# Patient Record
Sex: FEMALE | Race: WHITE | HISPANIC OR LATINO | Employment: FULL TIME | URBAN - METROPOLITAN AREA
[De-identification: names, ages, dates, MRNs, and addresses within clinical notes are randomized per-mention and may not be internally consistent; named-entity substitution may affect disease eponyms.]

---
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
## 2022-01-25 ENCOUNTER — TELEPHONE (OUTPATIENT)
Dept: FAMILY MEDICINE | Facility: CLINIC | Age: 22
End: 2022-01-25
Payer: COMMERCIAL

## 2022-01-25 DIAGNOSIS — Z30.9 ENCOUNTER FOR CONTRACEPTIVE MANAGEMENT, UNSPECIFIED TYPE: Primary | ICD-10-CM

## 2022-01-25 NOTE — TELEPHONE ENCOUNTER
Patient has apt Thursday 1-27-22 for depo injection, please place order.     Last ov 11-17-20 with Mirlande   Last Depo 11-10-21

## 2022-01-27 RX ORDER — MEDROXYPROGESTERONE ACETATE 150 MG/ML
150 INJECTION, SUSPENSION INTRAMUSCULAR
Status: ACTIVE | OUTPATIENT
Start: 2022-01-27 | End: 2023-01-22

## 2022-02-10 ENCOUNTER — ALLIED HEALTH/NURSE VISIT (OUTPATIENT)
Dept: FAMILY MEDICINE | Facility: CLINIC | Age: 22
End: 2022-02-10
Payer: COMMERCIAL

## 2022-02-10 DIAGNOSIS — Z30.42 DEPO-PROVERA CONTRACEPTIVE STATUS: Primary | ICD-10-CM

## 2022-02-10 PROCEDURE — 99207 PR NO CHARGE NURSE ONLY: CPT

## 2022-02-10 PROCEDURE — 96372 THER/PROPH/DIAG INJ SC/IM: CPT | Performed by: FAMILY MEDICINE

## 2022-02-10 RX ADMIN — MEDROXYPROGESTERONE ACETATE 150 MG: 150 INJECTION, SUSPENSION INTRAMUSCULAR at 13:26

## 2022-05-19 ENCOUNTER — TELEPHONE (OUTPATIENT)
Dept: FAMILY MEDICINE | Facility: CLINIC | Age: 22
End: 2022-05-19

## 2022-10-26 ENCOUNTER — NURSE TRIAGE (OUTPATIENT)
Dept: FAMILY MEDICINE | Facility: CLINIC | Age: 22
End: 2022-10-26

## 2022-10-26 NOTE — TELEPHONE ENCOUNTER
Patient Quality Outreach    Patient is due for the following:   Physical Preventive Adult Physical    Next Steps:   Schedule a Adult Preventative    Type of outreach:    Sent letter.    Next Steps:  Reach out within 90 days via Letter.    Max number of attempts reached: No. Will try again in 90 days if patient still on fail list.

## 2022-10-26 NOTE — LETTER
October 26, 2022      Luisana Rudd  [address] [place] [id number]        Dear Luisana,       In reviewing your records, we have identified a gap in your preventive services. Based on your age and health history, we recommend the following services.     Preventative Adult Physical     Please call [phone] and choose option 1 to schedule this appointment.      We believe that a strong preventive care program, including regular physical and follow-up healthcare appointments are an important part of a healthy lifestyle and we are committed to helping you maintain your health.     Thank you for choosing us as your health care provider.     Sincerely,        [facility]

## 2023-06-01 ENCOUNTER — APPOINTMENT (OUTPATIENT)
Dept: RADIOLOGY | Facility: CLINIC | Age: 23
End: 2023-06-01
Attending: EMERGENCY MEDICINE
Payer: COMMERCIAL

## 2023-06-01 ENCOUNTER — HOSPITAL ENCOUNTER (EMERGENCY)
Facility: CLINIC | Age: 23
Discharge: HOME | End: 2023-06-02
Attending: EMERGENCY MEDICINE | Admitting: EMERGENCY MEDICINE
Payer: COMMERCIAL

## 2023-06-01 VITALS
HEIGHT: 63 IN | RESPIRATION RATE: 16 BRPM | TEMPERATURE: 97.9 F | BODY MASS INDEX: 21.95 KG/M2 | SYSTOLIC BLOOD PRESSURE: 92 MMHG | OXYGEN SATURATION: 100 % | WEIGHT: 123.9 LBS | HEART RATE: 59 BPM | DIASTOLIC BLOOD PRESSURE: 41 MMHG
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
## 2023-06-01 DIAGNOSIS — S41.152A DOG BITE OF LEFT UPPER EXTREMITY, INITIAL ENCOUNTER: ICD-10-CM

## 2023-06-01 PROCEDURE — 73090 X-RAY EXAM OF FOREARM: CPT | Mod: LT

## 2023-06-01 PROCEDURE — 250N000013 HC RX MED GY IP 250 OP 250 PS 637: Performed by: EMERGENCY MEDICINE

## 2023-06-01 PROCEDURE — 99284 EMERGENCY DEPT VISIT MOD MDM: CPT

## 2023-06-01 RX ORDER — IBUPROFEN 600 MG/1
600 TABLET, FILM COATED ORAL ONCE
Status: COMPLETED | OUTPATIENT
Start: 2023-06-01 | End: 2023-06-01

## 2023-06-01 RX ORDER — OXYCODONE HYDROCHLORIDE 5 MG/1
5 TABLET ORAL EVERY 6 HOURS PRN
Qty: 12 TABLET | Refills: 0 | Status: SHIPPED | OUTPATIENT
Start: 2023-06-01 | End: 2023-06-04

## 2023-06-01 RX ORDER — OXYCODONE HYDROCHLORIDE 5 MG/1
5 TABLET ORAL ONCE
Status: COMPLETED | OUTPATIENT
Start: 2023-06-02 | End: 2023-06-02

## 2023-06-01 RX ADMIN — IBUPROFEN 600 MG: 600 TABLET ORAL at 23:20

## 2023-06-01 ASSESSMENT — ENCOUNTER SYMPTOMS
WOUND: 1
MYALGIAS: 1

## 2023-06-01 NOTE — LETTER
June 2, 2023      To Whom It May Concern:      Luisana Rudd was seen in our Emergency Department today, 06/02/23.  I expect her condition to improve over the next 5 days.  She may return to work  when improved.    Sincerely,        Miguel Merchant MD

## 2023-06-02 PROCEDURE — 250N000013 HC RX MED GY IP 250 OP 250 PS 637: Performed by: EMERGENCY MEDICINE

## 2023-06-02 RX ADMIN — OXYCODONE HYDROCHLORIDE 5 MG: 5 TABLET ORAL at 00:05

## 2023-06-02 NOTE — ED TRIAGE NOTES
Arrives to ED with c/o dog bite that occurred just PTA. Multiple punctures to LFA. Bleeding controlled. Endorses tingling. Bit by own dog, UTD on rabies vaccine.      Triage Assessment     Row Name 06/01/23 9395       Triage Assessment (Adult)    Airway WDL WDL       Respiratory WDL    Respiratory WDL WDL       Skin Circulation/Temperature WDL    Skin Circulation/Temperature WDL WDL       Cardiac WDL    Cardiac WDL X;rhythm    Pulse Rate & Regularity bradycardic       Peripheral/Neurovascular WDL    Peripheral Neurovascular WDL WDL       Cognitive/Neuro/Behavioral WDL    Cognitive/Neuro/Behavioral WDL WDL

## 2023-06-02 NOTE — ED PROVIDER NOTES
EMERGENCY DEPARTMENT ENCOUNTER      NAME: Luisana Rudd  AGE: 22 year old female  YOB: 2000  MRN: [id number]  EVALUATION DATE & TIME: No admission date for patient encounter.    PCP: Kari Munoz    ED PROVIDER: Reji Merchant M.D.      Chief Complaint   Patient presents with     Dog Bite         FINAL IMPRESSION:  1. Dog bite of left upper extremity, initial encounter          ED COURSE & MEDICAL DECISION MAKING:    Pertinent Labs & Imaging studies reviewed. (See chart for details)  22 year old female presents to the Emergency Department for evaluation of dog bite.  Her dog bit her left forearm.  She was  her dog from a fight.  This was a provoked attack.  Rabies vaccinations up-to-date.  Patient's tetanus up-to-date.  Wound irrigated.  X-ray does not show any broken bone or foreign body.  Given that the dog bite a do not think I would repair these lacerations.  We will place her on antibiotics.  She will follow-up with primary.  Return for worsening symptoms.    11:11 PM I met with the patient to gather history and to perform my initial exam. I discussed the plan for care while in the Emergency Department.  11:53 PM Rechecked and updated the patient. We discussed the plan for discharge and the patient is agreeable. Reviewed supportive cares, symptomatic treatment, outpatient follow up, and reasons to return to the Emergency Department. Patient to be discharged by ED RN.     At the conclusion of the encounter I discussed the results of all of the tests and the disposition. The questions were answered. The patient or family acknowledged understanding and was agreeable with the care plan.     Medical Decision Making    History:    Supplemental history from: Documented in chart, if applicable and Family Member/Significant Other    External Record(s) reviewed: Documented in chart, if applicable.    Work Up:    Chart documentation includes differential considered and any EKGs or imaging  independently interpreted by provider, where specified.    In additional to work up documented, I considered the following work up: Documented in chart, if applicable.    External consultation:    Discussion of management with another provider: Documented in chart, if applicable    Complicating factors:    Care impacted by chronic illness: Mental Health    Care affected by social determinants of health: N/A    Disposition considerations: Discharge. I prescribed additional prescription strength medication(s) as charted. N/A.          MEDICATIONS GIVEN IN THE EMERGENCY:  Medications   ibuprofen (ADVIL/MOTRIN) tablet 600 mg (600 mg Oral $Given 6/1/23 2320)   oxyCODONE (ROXICODONE) tablet 5 mg (5 mg Oral $Given 6/2/23 0005)       NEW PRESCRIPTIONS STARTED AT TODAY'S ER VISIT  Discharge Medication List as of 6/1/2023 11:59 PM      START taking these medications    Details   amoxicillin-clavulanate (AUGMENTIN) 875-125 MG tablet Take 1 tablet by mouth 2 times daily for 7 days, Disp-14 tablet, R-0, Local Print      oxyCODONE (ROXICODONE) 5 MG tablet Take 1 tablet (5 mg) by mouth every 6 hours as needed, Disp-12 tablet, R-0, Local Print                =================================================================    HPI    Patient information was obtained from: Patient     Use of : N/A         Luisana Rudd is a 22 year old female with no pertinent medical history who presents to this ED via car for evaluation of dog bite.     Patient reports a dog bite on her left arm earlier this evening. States that she was trying to separate her 2 dogs from fighting when one of them bit her. She endorses left arm pain after the bite. Has not taken any medications for her pain yet. Her dog is up-to-date on his immunizations. She has a latex allergy. Patient is otherwise healthy and denies a history of medical problems.       REVIEW OF SYSTEMS   Review of Systems   Musculoskeletal: Positive for myalgias (Left arm pain ).    Skin: Positive for wound (Left arm).   All other systems reviewed and are negative.       PAST MEDICAL HISTORY:  Past Medical History:   Diagnosis Date     Depressive disorder        PAST SURGICAL HISTORY:  Past Surgical History:   Procedure Laterality Date     ENT SURGERY  14 months    Tubes in her ears     HC REMOVE TONSILS/ADENOIDS,<13 Y/O      Description: Tonsillectomy With Adenoidectomy;  Recorded: 01/22/2009;           CURRENT MEDICATIONS:    No current facility-administered medications for this encounter.     Current Outpatient Medications   Medication     amoxicillin-clavulanate (AUGMENTIN) 875-125 MG tablet     oxyCODONE (ROXICODONE) 5 MG tablet     medroxyPROGESTERone (DEPO-PROVERA) 150 MG/ML IM injection     Facility-Administered Medications Ordered in Other Encounters   Medication     acetaminophen (TYLENOL) tablet 650 mg     benzocaine-menthol (CEPACOL) 15-3.6 MG lozenge 1 lozenge     calcium carbonate (TUMS) chewable tablet 1,000 mg     ibuprofen (ADVIL/MOTRIN) tablet 200 mg         ALLERGIES:  Allergies   Allergen Reactions     Latex Hives       FAMILY HISTORY:  Family History   Problem Relation Age of Onset     Anxiety Disorder Mother      Depression Maternal Grandmother      Anxiety Disorder Maternal Grandmother      Bipolar Disorder Maternal Grandfather      Mental Illness Paternal Grandmother      Anxiety Disorder Brother      Schizophrenia No family hx of      Substance Abuse No family hx of      Dementia No family hx of      Adriana Disease No family hx of      Parkinsonism No family hx of      Autism Spectrum Disorder No family hx of      Intellectual Disability (Mental Retardation) No family hx of      Cervical Cancer Mother      Skin Cancer Maternal Grandmother      Clotting Disorder No family hx of        SOCIAL HISTORY:   Social History     Socioeconomic History     Marital status: Single   Tobacco Use     Smoking status: Never     Smokeless tobacco: Never   Substance and Sexual  "Activity     Alcohol use: Not Currently     Drug use: Not Currently   Social History Narrative    ** Merged History Encounter **         VITALS:  BP 92/41   Pulse 59   Temp 97.9  F (36.6  C) (Temporal)   Resp 16   Ht 1.6 m (5' 3\")   Wt 56.2 kg (123 lb 14.4 oz)   LMP 05/17/2023   SpO2 100%   BMI 21.95 kg/m      PHYSICAL EXAM    Physical Exam  Vitals and nursing note reviewed.   Constitutional:       General: She is not in acute distress.     Appearance: She is not diaphoretic.   HENT:      Head: Atraumatic.      Mouth/Throat:      Pharynx: No oropharyngeal exudate.   Eyes:      General: No scleral icterus.     Pupils: Pupils are equal, round, and reactive to light.   Cardiovascular:      Rate and Rhythm: Normal rate and regular rhythm.      Heart sounds: Normal heart sounds.   Pulmonary:      Effort: No respiratory distress.      Breath sounds: Normal breath sounds.   Abdominal:      Palpations: Abdomen is soft.      Tenderness: There is no abdominal tenderness. There is no guarding or rebound. Negative signs include Costello's sign.   Musculoskeletal:         General: Signs of injury present. No tenderness.      Comments: 2 small teeth marks on both the ventral and dorsal aspects of her forearm.  Small amount of bleeding.  Pulses intact.  Strength intact   Skin:     General: Skin is warm.      Findings: No rash.   Neurological:      General: No focal deficit present.      Mental Status: She is alert.           LAB:  All pertinent labs reviewed and interpreted.  Labs Ordered and Resulted from Time of ED Arrival to Time of ED Departure - No data to display    RADIOLOGY:  Reviewed all pertinent imaging. Please see official radiology report.  Radius/Ulna XR,  PA &LAT, left   Final Result   IMPRESSION: Small foci of gas in the soft tissues of the volar aspect of the distal forearm, consistent with bite injury. No underlying fracture or radiopaque foreign body.              Ericka QUEVEDO, am serving as a " scribe to document services personally performed by Dr. Reji Merchant, based on my observation and the provider's statements to me. I, Reji Merchant MD attest that Ericka Wayne is acting in a scribe capacity, has observed my performance of the services and has documented them in accordance with my direction.    Reji Merchant M.D.  Emergency Medicine  [facility] EMERGENCY ROOM  [address] [facility] [id number] [phone]  Dept: [phone]     Reji Merchant MD  06/02/23 0223

## 2023-06-13 ENCOUNTER — OFFICE VISIT (OUTPATIENT)
Dept: FAMILY MEDICINE | Facility: CLINIC | Age: 23
End: 2023-06-13
Payer: COMMERCIAL

## 2023-06-13 VITALS
HEIGHT: 64 IN | SYSTOLIC BLOOD PRESSURE: 96 MMHG | BODY MASS INDEX: 21.15 KG/M2 | RESPIRATION RATE: 16 BRPM | DIASTOLIC BLOOD PRESSURE: 54 MMHG | WEIGHT: 123.9 LBS | OXYGEN SATURATION: 98 % | TEMPERATURE: 98.9 F | HEART RATE: 79 BPM

## 2023-06-13 DIAGNOSIS — S41.152D DOG BITE OF LEFT UPPER EXTREMITY, SUBSEQUENT ENCOUNTER: Primary | ICD-10-CM
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
## 2023-06-13 PROCEDURE — 99212 OFFICE O/P EST SF 10 MIN: CPT | Performed by: FAMILY MEDICINE

## 2023-06-13 ASSESSMENT — PATIENT HEALTH QUESTIONNAIRE - PHQ9
SUM OF ALL RESPONSES TO PHQ QUESTIONS 1-9: 0
10. IF YOU CHECKED OFF ANY PROBLEMS, HOW DIFFICULT HAVE THESE PROBLEMS MADE IT FOR YOU TO DO YOUR WORK, TAKE CARE OF THINGS AT HOME, OR GET ALONG WITH OTHER PEOPLE: NOT DIFFICULT AT ALL
SUM OF ALL RESPONSES TO PHQ QUESTIONS 1-9: 0

## 2023-06-13 ASSESSMENT — PAIN SCALES - GENERAL: PAINLEVEL: NO PAIN (0)

## 2023-06-13 NOTE — PROGRESS NOTES
"  Assessment & Plan     Dog bite of left upper extremity, subsequent encounter  Bit by her own pit bull 2 weeks ago; went to ER x-rays taken of forearm she had 3 deep puncture wounds of her forearm with swelling; wounds were left open she was placed on antibiotics she has been wrapping it since; wound looks good she may leave this open to air we instructed her in some flexion extension exercises of her left wrist             MED REC REQUIRED  Post Medication Reconciliation Status:       José Miguel Casey MD  [facility] TYLER Lieberman is a 22 year old, presenting for the following health issues:  Recheck Medication and ER F/U (Dog bite on L arm 6/1/2023)        6/13/2023     9:13 AM   Additional Questions   Roomed by Belle ALVARENGA             Review of Systems   Constitutional, HEENT, cardiovascular, pulmonary, gi and gu systems are negative, except as otherwise noted.      Objective    BP 96/54 (BP Location: Right arm, Patient Position: Sitting, Cuff Size: Adult Regular)   Pulse 79   Temp 98.9  F (37.2  C) (Temporal)   Resp 16   Ht 1.613 m (5' 3.5\")   Wt 56.2 kg (123 lb 14.4 oz)   LMP 05/17/2023   SpO2 98%   BMI 21.60 kg/m    Body mass index is 21.6 kg/m .  Physical Exam   GENERAL: healthy, alert and no distress  NECK: no adenopathy, no asymmetry, masses, or scars and thyroid normal to palpation  RESP: lungs clear to auscultation - no rales, rhonchi or wheezes  CV: regular rate and rhythm, normal S1 S2, no S3 or S4, no murmur, click or rub, no peripheral edema and peripheral pulses strong  ABDOMEN: soft, nontender, no hepatosplenomegaly, no masses and bowel sounds normal  MS: no gross musculoskeletal defects noted, no edema    3 healed bite wounds puncture wounds volar side left forearm; 1 puncture wound dorsum alternates side forearm well-healed                Answers for HPI/ROS submitted by the patient on 6/13/2023  If you checked off any problems, how difficult have these " problems made it for you to do your work, take care of things at home, or get along with other people?: Not difficult at all  PHQ9 TOTAL SCORE: 0

## 2023-07-30 ENCOUNTER — HEALTH MAINTENANCE LETTER (OUTPATIENT)
Age: 23
End: 2023-07-30

## 2023-08-01 ENCOUNTER — LAB (OUTPATIENT)
Dept: LAB | Facility: CLINIC | Age: 23
End: 2023-08-01
Payer: COMMERCIAL

## 2023-08-01 DIAGNOSIS — Z11.3 SCREENING FOR STDS (SEXUALLY TRANSMITTED DISEASES): Primary | ICD-10-CM

## 2023-08-01 DIAGNOSIS — Z11.3 ROUTINE SCREENING FOR STI (SEXUALLY TRANSMITTED INFECTION): ICD-10-CM

## 2023-08-01 DIAGNOSIS — Z11.3 SCREENING FOR STDS (SEXUALLY TRANSMITTED DISEASES): ICD-10-CM

## 2023-08-01 PROCEDURE — 36415 COLL VENOUS BLD VENIPUNCTURE: CPT

## 2023-08-01 PROCEDURE — 87491 CHLMYD TRACH DNA AMP PROBE: CPT

## 2023-08-01 PROCEDURE — 87591 N.GONORRHOEAE DNA AMP PROB: CPT

## 2023-08-01 PROCEDURE — 86780 TREPONEMA PALLIDUM: CPT

## 2023-08-01 PROCEDURE — 87389 HIV-1 AG W/HIV-1&-2 AB AG IA: CPT

## 2023-08-02 LAB
C TRACH DNA SPEC QL NAA+PROBE: NEGATIVE
HIV 1+2 AB+HIV1 P24 AG SERPL QL IA: NONREACTIVE
N GONORRHOEA DNA SPEC QL NAA+PROBE: NEGATIVE
T PALLIDUM AB SER QL: NONREACTIVE

## 2023-08-03 NOTE — RESULT ENCOUNTER NOTE
Luisana  Your results from your recent clinic visit show:  Your STI testing was all normal    If you have more questions please call the clinic at [phone] or send me a [employer] message    Dr. Nato Caicedo

## 2023-11-07 ENCOUNTER — NURSE TRIAGE (OUTPATIENT)
Dept: NURSING | Facility: CLINIC | Age: 23
End: 2023-11-07
Payer: COMMERCIAL

## 2023-11-07 NOTE — TELEPHONE ENCOUNTER
"Nurse Triage SBAR    Situation:   -concern for burst Hemorid    Background:   -Patient calling, It is okay to leave a detailed message at this number.     Assessment:   -has had hemorohis in th past, not officaly diagnosed  -on Sunday thinks she burst a Hemorid  -now is bleeding with every BM  -dose not think blood is mixed into stool, but hard to see as toilit water turns red  -occurs about out 2-3x day  -right rib pain from coughing  -no dizziness  -no vomiting  -no diarrhea    Recommendation:   Go to ED Now   -Will discuss with family and then decide what to do  -Call back with and questions, concerns, or any change in symptoms    DAYAMI GREEN RN on 11/7/2023 at 1:07 PM       Reason for Disposition   [1] MODERATE rectal bleeding (small blood clots, passing blood without stool, or toilet water turns red) AND [2] more than once a day    Additional Information   Negative: Shock suspected (e.g., cold/pale/clammy skin, too weak to stand, low BP, rapid pulse)   Negative: Sounds like a life-threatening emergency to the triager   Negative: Difficult to awaken or acting confused (e.g., disoriented, slurred speech)   Negative: Passed out (i.e., lost consciousness, collapsed and was not responding)   Negative: [1] Vomiting AND [2] contains red blood or black (\"coffee ground\") material  (Exception: Few red streaks in vomit that only happened once.)   Negative: Diarrhea is main symptom   Negative: Stool color other than brown or tan is main concern  (no bleeding and no melena)   Negative: SEVERE rectal bleeding (large blood clots; constant or on and off bleeding)   Negative: SEVERE dizziness (e.g., unable to stand, requires support to walk, feels like passing out now)   Negative: Black or tarry bowel movements  (Exception: Chronic-unchanged black-grey BMs AND is taking iron pills or Pepto-Bismol.)   Negative: Pale skin (pallor) of new-onset or worsening    Protocols used: Rectal Bleeding-A-AH    "

## 2024-02-20 ENCOUNTER — APPOINTMENT (OUTPATIENT)
Dept: ULTRASOUND IMAGING | Facility: CLINIC | Age: 24
End: 2024-02-20
Payer: COMMERCIAL

## 2024-02-20 ENCOUNTER — HOSPITAL ENCOUNTER (EMERGENCY)
Facility: CLINIC | Age: 24
Discharge: HOME | End: 2024-02-20
Attending: EMERGENCY MEDICINE | Admitting: EMERGENCY MEDICINE
Payer: COMMERCIAL

## 2024-02-20 VITALS
BODY MASS INDEX: 20.75 KG/M2 | DIASTOLIC BLOOD PRESSURE: 71 MMHG | RESPIRATION RATE: 18 BRPM | WEIGHT: 119 LBS | HEART RATE: 61 BPM | OXYGEN SATURATION: 98 % | SYSTOLIC BLOOD PRESSURE: 124 MMHG | TEMPERATURE: 98.2 F

## 2024-02-20 DIAGNOSIS — R10.13 EPIGASTRIC PAIN: ICD-10-CM

## 2024-02-20 DIAGNOSIS — Z75.8 DOES NOT HAVE PRIMARY CARE PROVIDER: ICD-10-CM

## 2024-02-20 LAB
ALBUMIN SERPL BCG-MCNC: 5.1 G/DL (ref 3.5–5.2)
ALBUMIN UR-MCNC: 30 MG/DL
ALP SERPL-CCNC: 67 U/L (ref 40–150)
ALT SERPL W P-5'-P-CCNC: 28 U/L (ref 0–50)
ANION GAP SERPL CALCULATED.3IONS-SCNC: 18 MMOL/L (ref 7–15)
APPEARANCE UR: CLEAR
AST SERPL W P-5'-P-CCNC: 38 U/L (ref 0–45)
BASOPHILS # BLD AUTO: 0 10E3/UL (ref 0–0.2)
BASOPHILS NFR BLD AUTO: 0 %
BILIRUB DIRECT SERPL-MCNC: <0.2 MG/DL (ref 0–0.3)
BILIRUB SERPL-MCNC: 0.7 MG/DL
BILIRUB UR QL STRIP: NEGATIVE
BUN SERPL-MCNC: 14.1 MG/DL (ref 6–20)
CALCIUM SERPL-MCNC: 9.7 MG/DL (ref 8.6–10)
CHLORIDE SERPL-SCNC: 96 MMOL/L (ref 98–107)
COLOR UR AUTO: ABNORMAL
CREAT SERPL-MCNC: 0.92 MG/DL (ref 0.51–0.95)
DEPRECATED HCO3 PLAS-SCNC: 24 MMOL/L (ref 22–29)
EGFRCR SERPLBLD CKD-EPI 2021: 90 ML/MIN/1.73M2
EOSINOPHIL # BLD AUTO: 0 10E3/UL (ref 0–0.7)
EOSINOPHIL NFR BLD AUTO: 0 %
ERYTHROCYTE [DISTWIDTH] IN BLOOD BY AUTOMATED COUNT: 13.8 % (ref 10–15)
GLUCOSE SERPL-MCNC: 98 MG/DL (ref 70–99)
GLUCOSE UR STRIP-MCNC: NEGATIVE MG/DL
HCG UR QL: NEGATIVE
HCT VFR BLD AUTO: 42.2 % (ref 35–47)
HGB BLD-MCNC: 14 G/DL (ref 11.7–15.7)
HGB UR QL STRIP: NEGATIVE
IMM GRANULOCYTES # BLD: 0 10E3/UL
IMM GRANULOCYTES NFR BLD: 0 %
KETONES UR STRIP-MCNC: 60 MG/DL
LEUKOCYTE ESTERASE UR QL STRIP: NEGATIVE
LIPASE SERPL-CCNC: 8 U/L (ref 13–60)
LYMPHOCYTES # BLD AUTO: 0.8 10E3/UL (ref 0.8–5.3)
LYMPHOCYTES NFR BLD AUTO: 7 %
MAGNESIUM SERPL-MCNC: 2.1 MG/DL (ref 1.7–2.3)
MCH RBC QN AUTO: 28.6 PG (ref 26.5–33)
MCHC RBC AUTO-ENTMCNC: 33.2 G/DL (ref 31.5–36.5)
MCV RBC AUTO: 86 FL (ref 78–100)
MONOCYTES # BLD AUTO: 0.6 10E3/UL (ref 0–1.3)
MONOCYTES NFR BLD AUTO: 5 %
MUCOUS THREADS #/AREA URNS LPF: PRESENT /LPF
NEUTROPHILS # BLD AUTO: 10.6 10E3/UL (ref 1.6–8.3)
NEUTROPHILS NFR BLD AUTO: 88 %
NITRATE UR QL: NEGATIVE
NRBC # BLD AUTO: 0 10E3/UL
NRBC BLD AUTO-RTO: 0 /100
PH UR STRIP: 5.5 [PH] (ref 5–7)
PLATELET # BLD AUTO: 302 10E3/UL (ref 150–450)
POTASSIUM SERPL-SCNC: 3.7 MMOL/L (ref 3.4–5.3)
PROT SERPL-MCNC: 8.9 G/DL (ref 6.4–8.3)
RBC # BLD AUTO: 4.9 10E6/UL (ref 3.8–5.2)
RBC URINE: 1 /HPF
SODIUM SERPL-SCNC: 138 MMOL/L (ref 135–145)
SP GR UR STRIP: 1.01 (ref 1–1.03)
SQUAMOUS EPITHELIAL: 4 /HPF
UROBILINOGEN UR STRIP-MCNC: <2 MG/DL
WBC # BLD AUTO: 12.2 10E3/UL (ref 4–11)
WBC URINE: 1 /HPF

## 2024-02-20 PROCEDURE — 85025 COMPLETE CBC W/AUTO DIFF WBC: CPT

## 2024-02-20 PROCEDURE — 96374 THER/PROPH/DIAG INJ IV PUSH: CPT

## 2024-02-20 PROCEDURE — 83690 ASSAY OF LIPASE: CPT

## 2024-02-20 PROCEDURE — 81001 URINALYSIS AUTO W/SCOPE: CPT

## 2024-02-20 PROCEDURE — 83735 ASSAY OF MAGNESIUM: CPT

## 2024-02-20 PROCEDURE — 80053 COMPREHEN METABOLIC PANEL: CPT

## 2024-02-20 PROCEDURE — 250N000011 HC RX IP 250 OP 636: Performed by: EMERGENCY MEDICINE

## 2024-02-20 PROCEDURE — 96361 HYDRATE IV INFUSION ADD-ON: CPT

## 2024-02-20 PROCEDURE — 258N000003 HC RX IP 258 OP 636

## 2024-02-20 PROCEDURE — 82248 BILIRUBIN DIRECT: CPT

## 2024-02-20 PROCEDURE — 99285 EMERGENCY DEPT VISIT HI MDM: CPT | Mod: 25

## 2024-02-20 PROCEDURE — 36415 COLL VENOUS BLD VENIPUNCTURE: CPT

## 2024-02-20 PROCEDURE — 81025 URINE PREGNANCY TEST: CPT

## 2024-02-20 PROCEDURE — 250N000013 HC RX MED GY IP 250 OP 250 PS 637

## 2024-02-20 PROCEDURE — 250N000011 HC RX IP 250 OP 636

## 2024-02-20 PROCEDURE — 76705 ECHO EXAM OF ABDOMEN: CPT

## 2024-02-20 RX ORDER — SUCRALFATE 1 G/1
1 TABLET ORAL ONCE
Status: COMPLETED | OUTPATIENT
Start: 2024-02-20 | End: 2024-02-20

## 2024-02-20 RX ORDER — ONDANSETRON 4 MG/1
4 TABLET, ORALLY DISINTEGRATING ORAL EVERY 8 HOURS PRN
Qty: 8 TABLET | Refills: 0 | Status: SHIPPED | OUTPATIENT
Start: 2024-02-20 | End: 2024-02-23

## 2024-02-20 RX ORDER — ONDANSETRON 4 MG/1
4 TABLET, ORALLY DISINTEGRATING ORAL ONCE
Status: COMPLETED | OUTPATIENT
Start: 2024-02-20 | End: 2024-02-20

## 2024-02-20 RX ADMIN — SODIUM CHLORIDE 1000 ML: 9 INJECTION, SOLUTION INTRAVENOUS at 12:46

## 2024-02-20 RX ADMIN — FAMOTIDINE 20 MG: 10 INJECTION, SOLUTION INTRAVENOUS at 12:47

## 2024-02-20 RX ADMIN — SUCRALFATE 1 G: 1 TABLET ORAL at 12:48

## 2024-02-20 RX ADMIN — ONDANSETRON 4 MG: 4 TABLET, ORALLY DISINTEGRATING ORAL at 11:01

## 2024-02-20 NOTE — ED PROVIDER NOTES
ED CONSULTATION  Date/Time:2/20/2024 12:37 PM    I am seeing this patient along with Mariza Marshall PA-C.  I, Jesenia Meneses MD have reviewed the documentation, personally taken the patient's history, performed an exam and agree with the physical finds, diagnosis and management plan.    HPI: Luisana Rudd is a 23 year old female who presents to the ED with constant epigastric abdominal pain onset 4 days ago with associated nonbloody emesis. No fevers, diarrhea, melena, hematochezia, urinary symptoms, abnormal vaginal discharge, chest pain. Patient notes that her symptoms started after she had been drinking alcohol, she did not drink a significantly different amount of alcohol than she typically does. Patient drinks alcohol once a week, smokes marijuana once a week, vapes, going through a cartridge every 2 weeks. She denies any NSAID use. She denies history of similar symptoms. No recent travel or recent antibiotic use. She is otherwise healthy and does not take any medication on a regular basis. She denies chance of pregnancy.       The creation of this record is based on the scribe s observations of the work being performed by Jesenia Meneses MD and the provider s statements to them. It was created on her behalf by Faviola De Leon a trained medical scribe. This document has been checked and approved by the attending provider.    Physical Exam:/60   Pulse 56   Temp 98.2  F (36.8  C) (Temporal)   Resp 18   Wt 54 kg (119 lb)   LMP 02/13/2024 (Approximate)   SpO2 98%   Breastfeeding No   BMI 20.75 kg/m    Constitutional: Well developed, well nourished, no acute distress   EYES: Conjunctivae clear  HENT: Atraumatic, external ears normal, nose normal, oropharynx moist. Neck- supple   Respiratory: No respiratory distress, normal breath sounds, no rales, no wheezing   Cardiovascular: Normal rate, normal rhythm, no murmurs, no gallops, no rubs. No chest tenderness  GI: Soft, nondistended, normal bowel  sounds, no organomegaly, no masses, no rebound, no guarding. Upper abdominal tenderness.  : No CVA tenderness  Musculoskeletal: No edema, No tenderness, No cyanosis, No clubbing. Good range of motion in all major joints. No tenderness to palpation or major deformities noted.   Integument: Warm, Dry, No erythema, No rash.   Neurologic: Alert & oriented x 3, no focal deficits noted, ambulatory  Psych: Affect normal, Judgment normal, Mood normal.    MDM: Epigastric abdominal pain that started about 4 days ago.  Patient has had associated emesis.  On exam, she is well-appearing, nontoxic with some mild upper abdominal tenderness, otherwise benign abdomen.  Initial right upper quadrant ultrasound with no acute findings, laboratory studies with slight leukocytosis, WBC of 12.2.  To offer CT scan of the abdomen pelvis, but the patient declines at this time, she would like to go home.  We did encourage her to return if her symptoms acutely worsen or if her symptoms localize.    Results for orders placed or performed during the hospital encounter of 02/20/24   US Abdomen Limited (RUQ)    Impression    IMPRESSION:  1.  Normal limited abdominal ultrasound.       Basic metabolic panel   Result Value Ref Range    Sodium 138 135 - 145 mmol/L    Potassium 3.7 3.4 - 5.3 mmol/L    Chloride 96 (L) 98 - 107 mmol/L    Carbon Dioxide (CO2) 24 22 - 29 mmol/L    Anion Gap 18 (H) 7 - 15 mmol/L    Urea Nitrogen 14.1 6.0 - 20.0 mg/dL    Creatinine 0.92 0.51 - 0.95 mg/dL    GFR Estimate 90 >60 mL/min/1.73m2    Calcium 9.7 8.6 - 10.0 mg/dL    Glucose 98 70 - 99 mg/dL   Hepatic function panel   Result Value Ref Range    Protein Total 8.9 (H) 6.4 - 8.3 g/dL    Albumin 5.1 3.5 - 5.2 g/dL    Bilirubin Total 0.7 <=1.2 mg/dL    Alkaline Phosphatase 67 40 - 150 U/L    AST 38 0 - 45 U/L    ALT 28 0 - 50 U/L    Bilirubin Direct <0.20 0.00 - 0.30 mg/dL   Result Value Ref Range    Lipase 8 (L) 13 - 60 U/L   Result Value Ref Range    Magnesium 2.1 1.7 -  2.3 mg/dL   UA with Microscopic reflex to Culture    Specimen: Urine, Clean Catch   Result Value Ref Range    Color Urine Light Yellow Colorless, Straw, Light Yellow, Yellow    Appearance Urine Clear Clear    Glucose Urine Negative Negative mg/dL    Bilirubin Urine Negative Negative    Ketones Urine 60 (A) Negative mg/dL    Specific Gravity Urine 1.011 1.001 - 1.030    Blood Urine Negative Negative    pH Urine 5.5 5.0 - 7.0    Protein Albumin Urine 30 (A) Negative mg/dL    Urobilinogen Urine <2.0 <2.0 mg/dL    Nitrite Urine Negative Negative    Leukocyte Esterase Urine Negative Negative    Mucus Urine Present (A) None Seen /LPF    RBC Urine 1 <=2 /HPF    WBC Urine 1 <=5 /HPF    Squamous Epithelials Urine 4 (H) <=1 /HPF   HCG qualitative urine (UPT)   Result Value Ref Range    hCG Urine Qualitative Negative Negative   CBC with platelets and differential   Result Value Ref Range    WBC Count 12.2 (H) 4.0 - 11.0 10e3/uL    RBC Count 4.90 3.80 - 5.20 10e6/uL    Hemoglobin 14.0 11.7 - 15.7 g/dL    Hematocrit 42.2 35.0 - 47.0 %    MCV 86 78 - 100 fL    MCH 28.6 26.5 - 33.0 pg    MCHC 33.2 31.5 - 36.5 g/dL    RDW 13.8 10.0 - 15.0 %    Platelet Count 302 150 - 450 10e3/uL    % Neutrophils 88 %    % Lymphocytes 7 %    % Monocytes 5 %    % Eosinophils 0 %    % Basophils 0 %    % Immature Granulocytes 0 %    NRBCs per 100 WBC 0 <1 /100    Absolute Neutrophils 10.6 (H) 1.6 - 8.3 10e3/uL    Absolute Lymphocytes 0.8 0.8 - 5.3 10e3/uL    Absolute Monocytes 0.6 0.0 - 1.3 10e3/uL    Absolute Eosinophils 0.0 0.0 - 0.7 10e3/uL    Absolute Basophils 0.0 0.0 - 0.2 10e3/uL    Absolute Immature Granulocytes 0.0 <=0.4 10e3/uL    Absolute NRBCs 0.0 10e3/uL       1. Epigastric pain    2. Does not have primary care provider          New Prescriptions    ONDANSETRON (ZOFRAN ODT) 4 MG ODT TAB    Take 1 tablet (4 mg) by mouth every 8 hours as needed for nausea       Final disposition will be per the depending diagnostic studies and patient's  clinical trajectory.    This is an accurate record of my words and actions during this visit as documented by the scribe.      Jesenia Meneses MD  02/20/24 [id number]

## 2024-02-20 NOTE — DISCHARGE INSTRUCTIONS
WE DO NOT KNOW YET WHAT IS THE CAUSE OF YOUR ABDOMINAL PAIN:    Your Ultrasound did not show a problem with the gallbladder.  Your urine is not consistent with a kidney infection.    Because we do not know yet what is the cause of your symptoms, please follow up in clinic with primary care as soon as possible for repeat exam and consideration of additional testing.     You can take ondansetron (Zofran) as needed for nausea.  You can take acetaminophen (Tylenol) for pain as needed. You can take 650 to 1000 mg of Acetaminophen (Tylenol) every 6 to 8 hours.  Please do not take more than 3000 mg in a 24 hour period.  Acetaminophen (Tylenol) is an effective drug when taken at the prescribed dosages, but can cause bodily injury including liver damage if taken too often or at too high of dose. Avoid taking more than 1 acetaminophen-containing product at a time and be aware that many over-the-counter medications contain a combination of acetaminophen or other products. If you are taking Acetaminophen (Tylenol) and another medication containing acetaminophen, please keep track of your total daily amount of acetaminophen that you are taking to make sure you do not take more than the maximum recommended dose. Taking too much acetaminophen can cause permanent damage to your liver.  Please call to schedule that follow up appointment with primary care as soon as you can.  I did place a referral for you to establish care with primary care.  If you have develop worsening abdominal pain, localized abdominal pain, chest pain, trouble breathing, fever or any other new or concerning problems, PLEASE RETURN to the Emergency Department for additional evaluation.      ______________________________________________________________

## 2024-02-20 NOTE — ED PROVIDER NOTES
EMERGENCY DEPARTMENT ENCOUNTER      NAME: Luisana Rudd  AGE: 23 year old female  YOB: 2000  MRN: [id number]  EVALUATION DATE & TIME: 02/20/24 12:01 PM    PCP: Kari Munoz    ED PROVIDER: Mariza Marshall PA-C      CHIEF COMPLAINT:  Abdominal Pain and Vomiting      FINAL IMPRESSION:  1. Epigastric pain    2. Does not have primary care provider          ED COURSE & MEDICAL DECISION MAKING:  Pertinent Labs & Imaging studies reviewed. (See chart for details)      ED Course as of 02/26/24 2220 Tue Feb 20, 2024   1204 I met and introduced myself to the patient. I gathered initial history and performed an initial physical exam. We discussed options and plan for diagnostics and treatment here in the ED.   1217 The patient is an otherwise healthy 23-year-old female presenting to the emergency department for evaluation of constant epigastric abdominal pain onset 4 days ago with associated nonbloody emesis.  No fevers, diarrhea, melena, hematochezia, urinary symptoms, abnormal vaginal discharge, chest pain.  Patient notes that her symptoms started after she had been drinking alcohol, she did not drink a significantly different amount of alcohol than she typically does.  Patient drinks alcohol once a week, smokes marijuana once a week, vapes, going through a cartridge every 2 weeks.  She denies any NSAID use.  She denies history of similar symptoms. No recent travel or recent antibiotic use.  She is otherwise healthy and does not take any medication on a regular basis.  She denies chance of pregnancy.     1220 Initial vital signs reviewed and all within normal limits.  Patient is afebrile with no antipyretics taken prior to arrival at home.  On exam, patient is mildly uncomfortable appearing, requesting IV and fluids.  She is nontoxic-appearing and is resting in chair in no acute distress.  Mucous membranes are slightly dry.  Abdomen is soft, nondistended with epigastric tenderness to palpation, no  rebound or guarding.  Bowel sounds are present.   1221 Differential diagnosis includes GERD, esophagitis, PUD, H Pylori, gastritis, pancreatitis, hepatobiliary disease. I have low suspicion for appendicitis, diverticulitis, obstruction, pyelonephritis, nephrolithiasis, atypical cardiac ischemia, mesenteric ischemia, perforated viscous, aortic dissection, or other acute intra-abdominal and/or surgical process given history and exam. Low clinical suspicion for transverse colitis / diverticulitis given lack of diarrhea or fevers or for appendicitis given no RLQ tenderness. Given not suspecting colitis or appendicits, no CT ABD was done. RUQ ultrasound ordered. Low clinical suspicion for atypical presentation of cardiac ischemia as patient does not have SOB or chest pain.     Patient does not drink alcohol frequently and denies excessive NSAID use. The patient does not have a history of acid reflux. Discussed options for workup and management with the patient. We have agreed on plan for IV fluids, to check basic labs, and try ondansetron, Carafate, and Pepcid for symptoms. At this point, I see no indication for imaging. Patient is comfortable with plan.     1239 I have staffed the patient with Dr. Jesenia Meneses, ED physician, who has evaluated the patient and agrees with all aspects of today's care.                                          1443 CBC with leukocytosis of 12.2, I suspect most likely secondary to stress de margination. BMP without significant electrolyte abnormality. Chloride decreased, anion gap 18, I suspect most likely secondary to vomiting. Magnesium within normal limits. Alk phos, bilirubin, AST/ALT, lipase not elevated. UA not suggestive of UTI. Pregnancy testing negative. RUQ ultrasound with no acute findings. Overall, patient history, exam, and workup here most consistent with epigastric abdominal pain, nausea, and vomiting. I suspect most likely etiology is gastritis versus viral  gastroenteritis.         I reassessed the patient. Repeat abdominal examination benign, abdomen soft, nontender with no rebound or guarding. Offered to  obtain CT abdomen pelvis, the patient declines and is eager for discharge to home. I updated the patient on her results. Discussed plan for discharge to home with close outpatient follow up with her primary care clinician, referral placed to establish with primary care. Prescription provided for ondansetron to use as needed for nausea and vomiting. The patient verbalized understanding and is comfortable with this plan. Strict return precautions including acute worsening or localizing of symptoms discussed.       At the conclusion of the encounter I discussed the results of all of the tests and the disposition. The questions were answered. The patient or family acknowledged understanding and was agreeable with the care plan.         Medical Decision Making  Obtained supplemental history:Supplemental history obtained?: No  Reviewed external records: External records reviewed?: No  Care impacted by chronic illness:N/A  Care significantly affected by social determinants of health:N/A  Did you consider but not order tests?: In addition to work-up documented, I considered the following work up: CT scan abdomen pelvis  Did you interpret images independently?: Independent interpretation of ECG and images noted in documentation, when applicable.  Consultation discussion with other provider:Did you involve another provider (consultant, MH, pharmacy, etc.)?: No  Discharge. I prescribed additional prescription strength medication(s) as charted. See documentation for any additional details.      CRITICAL CARE:  None      MEDICATIONS GIVEN IN THE EMERGENCY:  Medications   ondansetron (ZOFRAN ODT) ODT tab 4 mg (4 mg Oral $Given 2/20/24 1101)   sodium chloride 0.9% BOLUS 1,000 mL (0 mLs Intravenous Stopped 2/20/24 0337)   sucralfate (CARAFATE) tablet 1 g (1 g Oral $Given 2/20/24  6590)   famotidine (PEPCID) injection 20 mg (20 mg Intravenous $Given 2/20/24 2387)       NEW PRESCRIPTIONS STARTED AT TODAY'S ER VISIT  Discharge Medication List as of 2/20/2024  3:47 PM        START taking these medications    Details   ondansetron (ZOFRAN ODT) 4 MG ODT tab Take 1 tablet (4 mg) by mouth every 8 hours as needed for nausea, Disp-8 tablet, R-0, Local Print                =================================================================    HPI    Patient information was obtained from: the patient     Use of Intrepreter: N/A         Luisana Rudd is a 23 year old female with no pertinent medical history who presents to the emergency department for evaluation of abdominal pain and vomiting.    The patient reports constant epigastric abdominal pain onset 4 days ago with associated nonbloody emesis. No fevers, diarrhea, melena, hematochezia, urinary symptoms, abnormal vaginal discharge, chest pain. Patient notes that her symptoms started after she had been drinking alcohol, she did not drink a significantly different amount of alcohol than she typically does. Patient drinks alcohol once a week, smokes marijuana once a week, vapes, going through a cartridge every 2 weeks. She denies any NSAID use. She denies history of similar symptoms. No recent travel or recent antibiotic use. She is otherwise healthy and does not take any medication on a regular basis. She denies chance of pregnancy.  No prior abdominal surgeries.  No further concerns/complaints endorsed at this time.      PAST MEDICAL HISTORY:  Past Medical History:   Diagnosis Date    Depressive disorder        PAST SURGICAL HISTORY:  Past Surgical History:   Procedure Laterality Date    ENT SURGERY  14 months    Tubes in her ears    HC REMOVE TONSILS/ADENOIDS,<11 Y/O      Description: Tonsillectomy With Adenoidectomy;  Recorded: 01/22/2009;       CURRENT MEDICATIONS:    Prior to Admission Medications   Prescriptions Last Dose Informant Patient  Reported? Taking?   medroxyPROGESTERone (DEPO-PROVERA) 150 MG/ML IM injection   Yes No   Sig: Inject 150 mg into the muscle      Facility-Administered Medications: None       ALLERGIES:  Allergies   Allergen Reactions    Latex Hives    Pecjl-Jmnzh-Etdagzk-Pramoxine Unknown       FAMILY HISTORY:  Family History   Problem Relation Age of Onset    Anxiety Disorder Mother     Depression Maternal Grandmother     Anxiety Disorder Maternal Grandmother     Bipolar Disorder Maternal Grandfather     Mental Illness Paternal Grandmother     Anxiety Disorder Brother     Schizophrenia No family hx of     Substance Abuse No family hx of     Dementia No family hx of     Wayne Disease No family hx of     Parkinsonism No family hx of     Autism Spectrum Disorder No family hx of     Intellectual Disability (Mental Retardation) No family hx of     Cervical Cancer Mother     Skin Cancer Maternal Grandmother     Clotting Disorder No family hx of        SOCIAL HISTORY:  Social History     Tobacco Use    Smoking status: Never    Smokeless tobacco: Never   Substance Use Topics    Alcohol use: Not Currently    Drug use: Not Currently        VITALS:    First Vitals:  No data found.      No data found.        PHYSICAL EXAM  VITAL SIGNS: /71   Pulse 61   Temp 98.2  F (36.8  C) (Temporal)   Resp 18   Wt 54 kg (119 lb)   LMP 02/13/2024 (Approximate)   SpO2 98%   Breastfeeding No   BMI 20.75 kg/m     GENERAL: Awake, alert, answering questions appropriately, in no acute distress.  HEENT: Slightly dry mucous membranes. Posterior oropharynx clear with no erythema, no exudate. No tonsillar hypertrophy. Uvula midline. Tolerating secretions, no drooling.    SPEECH:  Easy to understand speech, Normal volume and marshall. Normal phonation.  PULMONARY: No respiratory distress, Breathing comfortably on room air. Lungs clear to auscultation bilaterally.  CARDIOVASCULAR: Regular rate and rhythm, radial pulses present, symmetric, and  normal.  ABDOMINAL: Soft, Nondistended, epigastric tenderness to palpation, No rebound or guarding, No palpable masses.  EXTREMITIES: Extremities are warm and well perfused. No lower extremity edema.  NEUROLOGIC: Moving all extremities spontaneously.   SKIN: Exposed areas of skin warm, dry, no rashes.  PSYCH: Normal mood and affect.         RADIOLOGY/LAB:  Reviewed all pertinent imaging. Please see official radiology report.  All pertinent labs reviewed and interpreted.  Results for orders placed or performed during the hospital encounter of 02/20/24   US Abdomen Limited (RUQ)    Impression    IMPRESSION:  1.  Normal limited abdominal ultrasound.       Basic metabolic panel   Result Value Ref Range    Sodium 138 135 - 145 mmol/L    Potassium 3.7 3.4 - 5.3 mmol/L    Chloride 96 (L) 98 - 107 mmol/L    Carbon Dioxide (CO2) 24 22 - 29 mmol/L    Anion Gap 18 (H) 7 - 15 mmol/L    Urea Nitrogen 14.1 6.0 - 20.0 mg/dL    Creatinine 0.92 0.51 - 0.95 mg/dL    GFR Estimate 90 >60 mL/min/1.73m2    Calcium 9.7 8.6 - 10.0 mg/dL    Glucose 98 70 - 99 mg/dL   Hepatic function panel   Result Value Ref Range    Protein Total 8.9 (H) 6.4 - 8.3 g/dL    Albumin 5.1 3.5 - 5.2 g/dL    Bilirubin Total 0.7 <=1.2 mg/dL    Alkaline Phosphatase 67 40 - 150 U/L    AST 38 0 - 45 U/L    ALT 28 0 - 50 U/L    Bilirubin Direct <0.20 0.00 - 0.30 mg/dL   Result Value Ref Range    Lipase 8 (L) 13 - 60 U/L   Result Value Ref Range    Magnesium 2.1 1.7 - 2.3 mg/dL   UA with Microscopic reflex to Culture    Specimen: Urine, Clean Catch   Result Value Ref Range    Color Urine Light Yellow Colorless, Straw, Light Yellow, Yellow    Appearance Urine Clear Clear    Glucose Urine Negative Negative mg/dL    Bilirubin Urine Negative Negative    Ketones Urine 60 (A) Negative mg/dL    Specific Gravity Urine 1.011 1.001 - 1.030    Blood Urine Negative Negative    pH Urine 5.5 5.0 - 7.0    Protein Albumin Urine 30 (A) Negative mg/dL    Urobilinogen Urine <2.0 <2.0  mg/dL    Nitrite Urine Negative Negative    Leukocyte Esterase Urine Negative Negative    Mucus Urine Present (A) None Seen /LPF    RBC Urine 1 <=2 /HPF    WBC Urine 1 <=5 /HPF    Squamous Epithelials Urine 4 (H) <=1 /HPF   HCG qualitative urine (UPT)   Result Value Ref Range    hCG Urine Qualitative Negative Negative   CBC with platelets and differential   Result Value Ref Range    WBC Count 12.2 (H) 4.0 - 11.0 10e3/uL    RBC Count 4.90 3.80 - 5.20 10e6/uL    Hemoglobin 14.0 11.7 - 15.7 g/dL    Hematocrit 42.2 35.0 - 47.0 %    MCV 86 78 - 100 fL    MCH 28.6 26.5 - 33.0 pg    MCHC 33.2 31.5 - 36.5 g/dL    RDW 13.8 10.0 - 15.0 %    Platelet Count 302 150 - 450 10e3/uL    % Neutrophils 88 %    % Lymphocytes 7 %    % Monocytes 5 %    % Eosinophils 0 %    % Basophils 0 %    % Immature Granulocytes 0 %    NRBCs per 100 WBC 0 <1 /100    Absolute Neutrophils 10.6 (H) 1.6 - 8.3 10e3/uL    Absolute Lymphocytes 0.8 0.8 - 5.3 10e3/uL    Absolute Monocytes 0.6 0.0 - 1.3 10e3/uL    Absolute Eosinophils 0.0 0.0 - 0.7 10e3/uL    Absolute Basophils 0.0 0.0 - 0.2 10e3/uL    Absolute Immature Granulocytes 0.0 <=0.4 10e3/uL    Absolute NRBCs 0.0 10e3/uL         EKG:  None      PROCEDURES:  None              Mariza Marshall PA-C  Emergency Medicine  [facility] EMERGENCY ROOM  [address] [id number] [phone]  Dept: [phone]     Mariza Marshall PA-C  02/26/24 9127

## 2024-02-20 NOTE — ED NOTES
Patient verbalized understanding of discharge instructions including medication administration and recommended follow up care as noted on discharge instructions.  Written discharge instructions given, denies any further questions.  Prescriptions: were printed and sent with patient.  Barriers to learning identified and addressed:  None observed. Discharged from the waiting room, see providers notes for further assessment.

## 2024-02-20 NOTE — ED TRIAGE NOTES
Pt arrives to ED with c/o upper abdominal pain and vomiting since Friday. Denies any diarrhea. States her mom gave her an antinausea medication last night and it made her symptoms worse. Pt has been taking an antiacid but does not know the names of either of the medications.      Triage Assessment (Adult)       Row Name 02/20/24 1056          Triage Assessment    Airway WDL WDL        Respiratory WDL    Respiratory WDL WDL        Skin Circulation/Temperature WDL    Skin Circulation/Temperature WDL WDL        Cardiac WDL    Cardiac WDL WDL        Peripheral/Neurovascular WDL    Peripheral Neurovascular WDL WDL        Cognitive/Neuro/Behavioral WDL    Cognitive/Neuro/Behavioral WDL WDL

## 2024-03-05 ENCOUNTER — OFFICE VISIT (OUTPATIENT)
Dept: FAMILY MEDICINE | Facility: CLINIC | Age: 24
End: 2024-03-05
Payer: COMMERCIAL

## 2024-03-05 VITALS
WEIGHT: 117 LBS | RESPIRATION RATE: 14 BRPM | BODY MASS INDEX: 21.53 KG/M2 | SYSTOLIC BLOOD PRESSURE: 115 MMHG | HEIGHT: 62 IN | DIASTOLIC BLOOD PRESSURE: 71 MMHG | OXYGEN SATURATION: 96 % | HEART RATE: 60 BPM | TEMPERATURE: 98.8 F

## 2024-03-05 DIAGNOSIS — K59.00 CONSTIPATION, UNSPECIFIED CONSTIPATION TYPE: ICD-10-CM

## 2024-03-05 DIAGNOSIS — K64.4 EXTERNAL HEMORRHOIDS: Primary | ICD-10-CM

## 2024-03-05 PROCEDURE — 99213 OFFICE O/P EST LOW 20 MIN: CPT | Performed by: NURSE PRACTITIONER

## 2024-03-05 RX ORDER — HYDROCORTISONE 25 MG/G
CREAM TOPICAL 2 TIMES DAILY PRN
Qty: 30 G | Refills: 0 | Status: SHIPPED | OUTPATIENT
Start: 2024-03-05

## 2024-03-05 ASSESSMENT — PAIN SCALES - GENERAL: PAINLEVEL: NO PAIN (0)

## 2024-03-05 NOTE — PROGRESS NOTES
Assessment and Plan:     External hemorrhoids  Constipation, unspecified constipation type  Will treat with Proctosol cream.  Discussed performing warm sitz bath's.  Will refer to colorectal surgery for further evaluation if symptoms persist or worsen.  Recommend keeping bowel movements soft by increasing fluid and fiber intake.  Discussed utilizing over-the-counter MiraLAX or Colace as well.  She is content with the plan.  - hydrocortisone, Perianal, (HYDROCORTISONE) 2.5 % cream  Dispense: 30 g; Refill: 0  - Adult Colorectal Surgery  Referral        Subjective:     Luisana is a 23 year old female presenting to the clinic for concerns for hemorrhoids.  Patient states she was ill in mid February.  She did not have a bowel movement for 1 full week.  She then suffered from constipation and strainined to have a bowel movement.  She has noticed intermittent bright red blood.  She has 2 bumps in the area.  She had her mother look and her mother thought she had hemorrhoids.  She has been having bowel movements twice daily which are soft.  She denies any pain with bowel movements.  She has been applying over-the-counter hemorrhoid cream.    Reviewof Systems: A complete 14 point review of systems was obtained and is negative or as stated in the history of present illness.    Social History     Socioeconomic History    Marital status: Single     Spouse name: Not on file    Number of children: Not on file    Years of education: Not on file    Highest education level: Not on file   Occupational History    Not on file   Tobacco Use    Smoking status: Never    Smokeless tobacco: Never   Substance and Sexual Activity    Alcohol use: Not Currently    Drug use: Not Currently    Sexual activity: Not on file   Other Topics Concern    Not on file   Social History Narrative    ** Merged History Encounter **       Social Determinants of Health     Financial Resource Strain: Not on file   Food Insecurity: Not on file  "  Transportation Needs: Not on file   Physical Activity: Not on file   Stress: Not on file   Social Connections: Not on file   Interpersonal Safety: Not on file   Housing Stability: Not on file       Active Ambulatory Problems     Diagnosis Date Noted    Generalized anxiety disorder 12/26/2019    Nausea and vomiting 05/08/2021    Mild single current episode of major depressive disorder (H24) 06/26/2018    Hearing loss 11/04/2021    Chronic serous otitis media 11/04/2021    Allergic rhinitis 11/04/2021     Resolved Ambulatory Problems     Diagnosis Date Noted    No Resolved Ambulatory Problems     Past Medical History:   Diagnosis Date    Depressive disorder        Family History   Problem Relation Age of Onset    Anxiety Disorder Mother     Depression Maternal Grandmother     Anxiety Disorder Maternal Grandmother     Bipolar Disorder Maternal Grandfather     Mental Illness Paternal Grandmother     Anxiety Disorder Brother     Schizophrenia No family hx of     Substance Abuse No family hx of     Dementia No family hx of     Larue Disease No family hx of     Parkinsonism No family hx of     Autism Spectrum Disorder No family hx of     Intellectual Disability (Mental Retardation) No family hx of     Cervical Cancer Mother     Skin Cancer Maternal Grandmother     Clotting Disorder No family hx of        Objective:     /71   Pulse 60   Temp 98.8  F (37.1  C)   Resp 14   Ht 1.575 m (5' 2\")   Wt 53.1 kg (117 lb)   LMP 02/13/2024 (Approximate)   SpO2 96%   BMI 21.40 kg/m      Patient is alert, in no obvious distress.   Skin: Warm, dry.    Rectal exam:  Large external hemorrhoid present which is non-thrombosed.          Answers submitted by the patient for this visit:  Patient Health Questionnaire (Submitted on 3/5/2024)  If you checked off any problems, how difficult have these problems made it for you to do your work, take care of things at home, or get along with other people?: Not difficult at " all  PHQ9 TOTAL SCORE: 0  General Questionnaire (Submitted on 3/5/2024)  Chief Complaint: Chronic problems general questions HPI Form  How many servings of fruits and vegetables do you eat daily?: 0-1  On average, how many sweetened beverages do you drink each day (Examples: soda, juice, sweet tea, etc.  Do NOT count diet or artificially sweetened beverages)?: 1  How many minutes a day do you exercise enough to make your heart beat faster?: 20 to 29  How many days a week do you exercise enough to make your heart beat faster?: 7  How many days per week do you miss taking your medication?: 0  General Concern (Submitted on 3/5/2024)  Chief Complaint: Chronic problems general questions HPI Form  What is the reason for your visit today?: to get checked about something new  When did your symptoms begin?: More than a month

## 2024-08-19 ENCOUNTER — NURSE TRIAGE (OUTPATIENT)
Dept: FAMILY MEDICINE | Facility: CLINIC | Age: 24
End: 2024-08-19
Payer: COMMERCIAL

## 2024-08-19 NOTE — TELEPHONE ENCOUNTER
Nurse Triage SBAR    Is this a 2nd Level Triage? NO    Situation:   Telephone call into the clinic, from the patient ,to report vaginal symptoms on Monday, 8/19/24    Background:   Hx of allergic rhinitis, FARIBA, and MDD    Assessment:   Patient reports vaginal symptoms starting around two days ago, Saturday, 8/17/24    Stated that she has had clumpy, white vaginal discharge, without a foul odor, as well as burning in her entire vulvar area    Also had slight vaginal bleeding after sexual intercourse two days ago    Rated vulvar stinging and burning as 3 out of ten pain    Also rated vulvar itching as 3 out of ten    Denies fever, dysuria, injury to genital area, vaginal foreign bodies, chest pain, trouble breathing, back or abdominal pain, rash, or chance of pregnancy    Last menstrual period started in the beginning of August 2024    Patient is concerned about having a STI    Protocol Recommended Disposition:   See in Office Within 3 Days    Recommendation:   Gave care advice. Recommended that the patient be evaluated in clinic in the next three days per disposition.    Patient verbalized understanding and agrees with the plan.    Writer assisted the patient with scheduling an in-clinic visit, with the PCP, on Wednesday, 8/21/24, at 3:10 pm, regarding vaginal symptoms.    Writer also relayed to the patient that, for worsening symptoms, then the patient should be evaluated in the ED or UC.    Patient verbalized understanding and agrees with the plan.    Denies other questions or concerns at this time.    Appt. 8/21/24 at 3:10 pm with PCP    Does the patient meet one of the following criteria for ADS visit consideration? 16+ years old, with an [facility] PCP     TIP  Providers, please consider if this condition is appropriate for management at one of our Acute and Diagnostic Services sites.     If patient is a good candidate, please use dotphrase <dot>triageresponse and select Refer to ADS to document.     Reason for  "Disposition   Symptoms of a yeast infection (i.e., itchy, white discharge, not bad smelling) and not improved > 3 days following Care Advice    Additional Information   Negative: Sounds like a life-threatening emergency to the triager   Negative: Followed a genital area injury (e.g., vagina, vulva)   Negative: Vaginal bleeding is main symptom   Negative: Vaginal discharge is main symptom   Negative: Pain or burning with passing urine (urination) is main symptom   Negative: Menstrual cramps is main symptom   Negative: Abdomen pain is main symptom   Negative: Pubic lice suspected   Negative: Itching or rash of female genital area (e.g., labia, vagina, vulva)   Negative: Pregnant and labor suspected   Negative: Patient sounds very sick or weak to the triager   Negative: SEVERE pain and not improved 2 hours after pain medicine   Negative: Genital area looks infected (e.g., draining sore, spreading redness) and fever   Negative: Something is hanging out of the vagina and can't easily be pushed back inside   Negative: MILD-MODERATE pain and present > 24 hours  (Exception: Chronic pain.)   Negative: Genital area looks infected (e.g., draining sore, spreading redness)   Negative: Rash with painful tiny water blisters   Negative: MODERATE-SEVERE itching (i.e., interferes with school, work, or sleep)   Negative: Rash (e.g., redness, tiny bumps, sore) of genital area and present > 24 hours   Negative: Tender lump (swelling or \"ball\") at vaginal opening    Answer Assessment - Initial Assessment Questions  1. SYMPTOM: \"What's the main symptom you're concerned about?\" (e.g., pain, itching, dryness)        Vulvar burning    2. LOCATION: \"Where is the burning located?\" (e.g., inside/outside, left/right)        Right and left sides of vulva    3. ONSET: \"When did the  burning  start?\"        Started two days ago    4. PAIN: \"Is there any pain?\" If Yes, ask: \"How bad is it?\" (Scale: 1-10; mild, moderate, severe)    -  MILD (1-3): " "Doesn't interfere with normal activities.     -  MODERATE (4-7): Interferes with normal activities (e.g., work or school) or awakens from sleep.      -  SEVERE (8-10): Excruciating pain, unable to do any normal activities.        3 out of ten-stinging and burning    5. ITCHING: \"Is there any itching?\" If Yes, ask: \"How bad is it?\" (Scale: 1-10; mild, moderate, severe)        Itching-rated as 3 out of ten    6. CAUSE: \"What do you think is causing the discharge?\" \"Have you had the same problem before? What happened then?\"        Clumpy, white discharge-no foul smell     7. OTHER SYMPTOMS: \"Do you have any other symptoms?\" (e.g., fever, itching, vaginal bleeding, pain with urination, injury to genital area, vaginal foreign body)        Denies fever, dysuria, injury to genital area, vaginal foreign bodies, chest pain, trouble breathing, back or abdominal pain, rash    vaginal bleeding after around 2 days ago after sexual intercourse    8. PREGNANCY: \"Is there any chance you are pregnant?\" \"When was your last menstrual period?\"        N/A-last period was beginning of August 2024    Protocols used: Vaginal Symptoms-A-OH    Desiree Mata RN, BSN  [facility]    "

## 2024-08-21 ENCOUNTER — OFFICE VISIT (OUTPATIENT)
Dept: FAMILY MEDICINE | Facility: CLINIC | Age: 24
End: 2024-08-21
Payer: COMMERCIAL

## 2024-08-21 VITALS
WEIGHT: 121.6 LBS | OXYGEN SATURATION: 100 % | BODY MASS INDEX: 22.38 KG/M2 | RESPIRATION RATE: 15 BRPM | SYSTOLIC BLOOD PRESSURE: 108 MMHG | DIASTOLIC BLOOD PRESSURE: 67 MMHG | TEMPERATURE: 98.8 F | HEART RATE: 81 BPM | HEIGHT: 62 IN

## 2024-08-21 DIAGNOSIS — Z11.59 NEED FOR HEPATITIS C SCREENING TEST: ICD-10-CM

## 2024-08-21 DIAGNOSIS — Z30.09 ENCOUNTER FOR OTHER GENERAL COUNSELING AND ADVICE ON CONTRACEPTION: ICD-10-CM

## 2024-08-21 DIAGNOSIS — Z12.4 CERVICAL CANCER SCREENING: ICD-10-CM

## 2024-08-21 DIAGNOSIS — Z11.3 SCREENING FOR STDS (SEXUALLY TRANSMITTED DISEASES): ICD-10-CM

## 2024-08-21 DIAGNOSIS — N76.0 VAGINITIS AND VULVOVAGINITIS: Primary | ICD-10-CM

## 2024-08-21 DIAGNOSIS — A59.9 TRICHOMONAS VAGINALIS INFECTION: ICD-10-CM

## 2024-08-21 LAB
CLUE CELLS: ABNORMAL
HCV AB SERPL QL IA: NONREACTIVE
HIV 1+2 AB+HIV1 P24 AG SERPL QL IA: NONREACTIVE
TRICHOMONAS, WET PREP: PRESENT
WBC'S/HIGH POWER FIELD, WET PREP: ABNORMAL
YEAST, WET PREP: ABNORMAL

## 2024-08-21 PROCEDURE — 99213 OFFICE O/P EST LOW 20 MIN: CPT | Performed by: FAMILY MEDICINE

## 2024-08-21 PROCEDURE — 87491 CHLMYD TRACH DNA AMP PROBE: CPT | Performed by: FAMILY MEDICINE

## 2024-08-21 PROCEDURE — 86803 HEPATITIS C AB TEST: CPT | Performed by: FAMILY MEDICINE

## 2024-08-21 PROCEDURE — 87210 SMEAR WET MOUNT SALINE/INK: CPT | Performed by: FAMILY MEDICINE

## 2024-08-21 PROCEDURE — 36415 COLL VENOUS BLD VENIPUNCTURE: CPT | Performed by: FAMILY MEDICINE

## 2024-08-21 PROCEDURE — 87591 N.GONORRHOEAE DNA AMP PROB: CPT | Performed by: FAMILY MEDICINE

## 2024-08-21 PROCEDURE — 87389 HIV-1 AG W/HIV-1&-2 AB AG IA: CPT | Performed by: FAMILY MEDICINE

## 2024-08-21 PROCEDURE — 86780 TREPONEMA PALLIDUM: CPT | Performed by: FAMILY MEDICINE

## 2024-08-21 PROCEDURE — G0145 SCR C/V CYTO,THINLAYER,RESCR: HCPCS | Performed by: FAMILY MEDICINE

## 2024-08-21 RX ORDER — METRONIDAZOLE 500 MG/1
500 TABLET ORAL 2 TIMES DAILY
Qty: 14 TABLET | Refills: 0 | Status: SHIPPED | OUTPATIENT
Start: 2024-08-21 | End: 2024-08-28

## 2024-08-21 ASSESSMENT — PATIENT HEALTH QUESTIONNAIRE - PHQ9
10. IF YOU CHECKED OFF ANY PROBLEMS, HOW DIFFICULT HAVE THESE PROBLEMS MADE IT FOR YOU TO DO YOUR WORK, TAKE CARE OF THINGS AT HOME, OR GET ALONG WITH OTHER PEOPLE: NOT DIFFICULT AT ALL
SUM OF ALL RESPONSES TO PHQ QUESTIONS 1-9: 0
SUM OF ALL RESPONSES TO PHQ QUESTIONS 1-9: 0

## 2024-08-21 ASSESSMENT — PAIN SCALES - GENERAL: PAINLEVEL: NO PAIN (0)

## 2024-08-21 NOTE — PATIENT INSTRUCTIONS
"Patient Education   Learning About Birth Control  Birth control is any method used to prevent pregnancy. If you have vaginal sex without birth control, you could get pregnant. The only sure way to not get pregnant is to not have sex. Finding birth control that works for you can help avoid an unplanned pregnancy.  What are the types of birth control?  There are many kinds of birth control. Each has pros and cons. Find what works for you.    Long-acting reversible contraception (LARC). These are placed inside your body by a doctor. They can prevent pregnancy for years.  Examples include:  An implant (hormonal).  Copper intrauterine device (IUD).  Hormonal IUDs.   Short-acting hormonal methods. These release hormones (estrogen and progestin, or progestin only).  Examples include:  Combination birth control pills (\"the pill\").  Skin patches.  A vaginal ring.  A shot.  Mini-pills.     Barrier methods. Use these every time you have vaginal sex.  Examples include:  External (male) condoms.  Internal (female) condoms.  Diaphragms.  Cervical caps.  Sponges.   Spermicides. These kill sperm or stop sperm from moving. They can be a gel, cream, foam, film, or tablet. Use them before vaginal sex.  Examples include:  Nonoxynol-9.  pH regulator gel.     Fertility awareness. You'll learn when you're most likely to become pregnant (fertile). You can avoid vaginal sex at that time.  It's also called:  Natural family planning.  The rhythm method.   Permanent birth control (sterilization). This can be an option if you're sure that you don't want to get pregnant later.  This includes:  Vasectomy.  Having tubes tied (tubal ligation).     Emergency contraception. This is a backup method. Use it if you didn't use birth control or your birth control method failed.  Examples include:  Copper and hormonal IUDs.  Emergency contraceptive pills.    Where can you learn more?  Go to https://www.healthwise.net/patiented  Enter R673 in the search box " "to learn more about \"Learning About Birth Control.\"  Current as of: November 27, 2023               Content Version: 14.0    6006-0499 [employer].   Care instructions adapted under license by your healthcare professional. If you have questions about a medical condition or this instruction, always ask your healthcare professional. [employer] disclaims any warranty or liability for your use of this information.       Patient Education   Learning About How Well Birth Control Options Work  Before choosing birth control, you may want to compare how well each option works. Some birth control options prevent pregnancy better than others.  Comparing birth control options  How well each birth control option works is given as a percentage. The closer the percentage is to 100%, the better that option works to prevent pregnancy. This is during the first year of typical use. Using no birth control works about 15% of the time.    Implant   More than 99%  Lasts up to 5 years.   IUD   More than 99%  Lasts for 3 to 12 years.   Sterilization   More than 99%  Usually is permanent.     Shot (injection)   96%  Get every 3 months.   Pill   93%  Take daily.   Patch   93%  Change every week.     Vaginal ring   93%  Change about every 3 weeks.   Male condom   87%  Use each time you have vaginal sex.   Fertility awareness   85%  Track daily, and avoid vaginal sex when you're fertile.     Cervical cap   68% to 84%  Use each time you have vaginal sex.   Diaphragm   83%  Use each time you have vaginal sex.   Sponge   73% to 86%  Use each time you have vaginal sex.     Withdrawal   80%  Use each time you have vaginal sex.   Spermicide   79%  Use each time you have vaginal sex.   Female condom   79%  Use each time you have vaginal sex.   Comparing emergency contraception  Emergency contraception is a backup method to birth control. You can use it if you didn't use birth control or your birth control method failed. " "There are two types. They are IUDs and pills. How well each works is given as a percentage. The closer the percentage is to 100%, the better that option works to prevent pregnancy.    IUD   More than 99%  Use within 5 days. It's placed by your doctor.   Ulipristal pill   98% to 99%  Use within 5 days. Get it from your doctor (prescription).   Levonorgestrel pill   97% to 98%  Use within 3 days. Buy it at a drugstore (no prescription).   Where can you learn more?  Go to https://www.[employer].net/patiented  Enter C898 in the search box to learn more about \"Learning About How Well Birth Control Options Work.\"  Current as of: November 27, 2023  Content Version: 14.1 2006-2024 [employer].   Care instructions adapted under license by your healthcare professional. If you have questions about a medical condition or this instruction, always ask your healthcare professional. [employer] disclaims any warranty or liability for your use of this information.       "

## 2024-08-21 NOTE — PROGRESS NOTES
Assessment & Plan     Vaginitis and vulvovaginitis  Will await wet prep, gonorrhea chlamydia screening.  Further follow-up and recommendations pending results.  - Wet preparation    Screening for STDs (sexually transmitted diseases)  Will screen for HIV, hepatitis C, syphilis, gonorrhea, chlamydia.  - HIV Antigen Antibody Combo Cascade; Future  - Treponema Abs w Reflex to RPR and Titer; Future  - NEISSERIA GONORRHOEA PCR  - CHLAMYDIA TRACHOMATIS PCR    Need for hepatitis C screening test  We will screen for hepatitis C today.  - Hepatitis C Screen Reflex to HCV RNA Quant and Genotype; Future    Cervical cancer screening  Await screening Pap smear results.  This is her first Pap.  - Pap Screen Only - Recommended Age 21 - 24 Years    Encounter for other general counseling and advice on contraception  Advised patient consider reliable method of pregnancy prevention when having intercourse with male partner.  Reviewed options available.  Written information provided.  She will consider but declines prescription today.                Antonino Lieberman is a 23 year old, presenting for the following health issues:  Vaginal Problem (check vaginal symptoms)      8/21/2024     3:03 PM   Additional Questions   Roomed by itzel     Here for evaluation of that abdominal burning and itching that began about a week ago, mild, no discharge, burning has resolved, continued mild itch.  Denies pelvic pain or cramping, dysuria, fevers, pelvic pain.  She has had the same male sexual partner for the past 10 months, has not been screened for sexually transmitted infections.  Has had previous male partners.  She is not currently using measures to prevent pregnancy, had used Depo-Provera in the past but had spotting for about a year after that and declines this.  She is not using condoms consistently.  She is not interested in pregnancy.  Prior history of single pregnancy ended with elective termination.  She is agreeable to gonorrhea  "chlamydia screening, HIV, hepatitis C, and syphilis screening, and Pap smear screening today.  She has not previously had Pap smear screening.    Vaginal Problem     History of Present Illness       Reason for visit:  Ichty  Symptom onset:  3-7 days ago  Symptoms include:  Ichty down there and burning  Symptom intensity:  Mild  Symptom progression:  Improving  Had these symptoms before:  No  What makes it worse:  No  What makes it better:  No   She is taking medications regularly.                     Objective    /67 (BP Location: Right arm, Patient Position: Sitting, Cuff Size: Adult Small)   Pulse 81   Temp 98.8  F (37.1  C) (Temporal)   Resp 15   Ht 1.577 m (5' 2.09\")   Wt 55.2 kg (121 lb 9.6 oz)   LMP 08/01/2024   SpO2 100%   BMI 22.18 kg/m    Body mass index is 22.18 kg/m .  Physical Exam   Alert very pleasant.  Pelvic - Normal external female genitalia.  Small amount of thin white discharge with bubbles noted in the vault.  Vaginal and cervical mucosa within normal limits on speculum exam.  Surepap obtained.  Wet prep and gonorrhea chlamydia probes obtained.            Signed Electronically by: Kari Munoz MD    "

## 2024-08-22 LAB
C TRACH DNA SPEC QL NAA+PROBE: NEGATIVE
N GONORRHOEA DNA SPEC QL NAA+PROBE: NEGATIVE
T PALLIDUM AB SER QL: NONREACTIVE

## 2024-08-22 NOTE — RESULT ENCOUNTER NOTE
Please call patient; OK to leave detailed voicemail:  No signs of additional sexually transmitted infections including HIV, hepatitis C, syphilis, gonorrhea, and chlamydia on your screening tests.  Please let me know if you had any difficulty obtaining a prescription for treatment of trichomonas for you and your partner

## 2024-08-26 LAB
BKR LAB AP GYN ADEQUACY: NORMAL
BKR LAB AP GYN INTERPRETATION: NORMAL
BKR LAB AP GYN OTHER FINDINGS: NORMAL
BKR LAB AP HPV REFLEX: NO
BKR LAB AP LMP: NORMAL
BKR LAB AP PREVIOUS ABNORMAL: NORMAL
PATH REPORT.COMMENTS IMP SPEC: NORMAL
PATH REPORT.COMMENTS IMP SPEC: NORMAL
PATH REPORT.RELEVANT HX SPEC: NORMAL

## 2024-09-22 ENCOUNTER — HEALTH MAINTENANCE LETTER (OUTPATIENT)
Age: 24
End: 2024-09-22